# Patient Record
Sex: MALE
[De-identification: names, ages, dates, MRNs, and addresses within clinical notes are randomized per-mention and may not be internally consistent; named-entity substitution may affect disease eponyms.]

---

## 2017-07-23 ENCOUNTER — HOSPITAL ENCOUNTER (OUTPATIENT)
Dept: HOSPITAL 14 - H.ER | Age: 37
Setting detail: OBSERVATION
LOS: 1 days | Discharge: HOME | End: 2017-07-24
Attending: EMERGENCY MEDICINE | Admitting: EMERGENCY MEDICINE
Payer: MEDICAID

## 2017-07-23 VITALS — RESPIRATION RATE: 17 BRPM | OXYGEN SATURATION: 100 %

## 2017-07-23 DIAGNOSIS — Z21: ICD-10-CM

## 2017-07-23 DIAGNOSIS — K52.9: Primary | ICD-10-CM

## 2017-07-24 VITALS — DIASTOLIC BLOOD PRESSURE: 71 MMHG | HEART RATE: 77 BPM | SYSTOLIC BLOOD PRESSURE: 121 MMHG | TEMPERATURE: 98.2 F

## 2017-07-24 LAB
ALBUMIN SERPL-MCNC: 4.2 G/DL (ref 3.5–5)
ALBUMIN/GLOB SERPL: 1.1 {RATIO} (ref 1–2.1)
ALT SERPL-CCNC: 52 U/L (ref 21–72)
AST SERPL-CCNC: 43 U/L (ref 17–59)
BACTERIA #/AREA URNS HPF: (no result) /[HPF]
BASOPHILS # BLD AUTO: 0 K/UL (ref 0–0.2)
BASOPHILS NFR BLD: 0.2 % (ref 0–2)
BILIRUB UR-MCNC: NEGATIVE MG/DL
BUN SERPL-MCNC: 15 MG/DL (ref 9–20)
CALCIUM SERPL-MCNC: 8.6 MG/DL (ref 8.4–10.2)
COLOR UR: YELLOW
EOSINOPHIL # BLD AUTO: 0 K/UL (ref 0–0.7)
EOSINOPHIL NFR BLD: 0.2 % (ref 0–4)
ERYTHROCYTE [DISTWIDTH] IN BLOOD BY AUTOMATED COUNT: 14.1 % (ref 11.5–14.5)
GFR NON-AFRICAN AMERICAN: > 60
GLUCOSE UR STRIP-MCNC: (no result) MG/DL
HGB BLD-MCNC: 14.8 G/DL (ref 12–18)
LEUKOCYTE ESTERASE UR-ACNC: (no result) LEU/UL
LIPASE SERPL-CCNC: 36 U/L (ref 23–300)
LYMPHOCYTE: 4 % (ref 20–50)
LYMPHOCYTES # BLD AUTO: 0.5 K/UL (ref 1–4.3)
LYMPHOCYTES NFR BLD AUTO: 4 % (ref 20–40)
MCH RBC QN AUTO: 32.4 PG (ref 27–31)
MCHC RBC AUTO-ENTMCNC: 34 G/DL (ref 33–37)
MCV RBC AUTO: 95.3 FL (ref 80–94)
MONOCYTE: 6 % (ref 0–10)
MONOCYTES # BLD: 1.3 K/UL (ref 0–0.8)
MONOCYTES NFR BLD: 9.6 % (ref 0–10)
NEUTROPHILS # BLD: 11.6 K/UL (ref 1.8–7)
NEUTROPHILS NFR BLD AUTO: 86 % (ref 50–75)
NEUTROPHILS NFR BLD AUTO: 89 % (ref 42–75)
NRBC BLD AUTO-RTO: 0 % (ref 0–0)
PH UR STRIP: 6 [PH] (ref 5–8)
PLATELET # BLD EST: NORMAL 10*3/UL
PLATELET # BLD: 175 K/UL (ref 130–400)
PMV BLD AUTO: 9.1 FL (ref 7.2–11.7)
PROT UR STRIP-MCNC: NEGATIVE MG/DL
RBC # BLD AUTO: 4.56 MIL/UL (ref 4.4–5.9)
RBC # UR STRIP: NEGATIVE /UL
SP GR UR STRIP: 1.02 (ref 1–1.03)
TOTAL CELLS COUNTED BLD: 100
URINE CLARITY: (no result)
URINE NITRATE: NEGATIVE
UROBILINOGEN UR-MCNC: (no result) MG/DL (ref 0.2–1)
VARIANT LYMPHS NFR BLD MANUAL: 1 % (ref 0–0)
WBC # BLD AUTO: 13.4 K/UL (ref 4.8–10.8)

## 2017-07-24 NOTE — CT
EXAM:

  CT Abdomen and Pelvis With Intravenous Contrast



CLINICAL HISTORY:

  37 years old, male; Pain; Abdominal pain; Generalized; Prior surgery; Surgery 

date: 6+ months; Surgery type: Appendectomy; Additional info: HX of hiv, fever, 

n/v/abd pain



TECHNIQUE:

  Axial computed tomography images of the abdomen and pelvis with intravenous 

contrast.  This CT exam was performed using one or more of the following dose 

reduction techniques:  automated exposure control, adjustment of the mA and/or 

kV according to patient size, and/or use of iterative reconstruction technique.

  Coronal and sagittal reformatted images were created and reviewed.



CONTRAST:

  95 mL of [id number] administered intravenously.



COMPARISON:

  No relevant prior studies available.



FINDINGS:

  Limitations:  Motion artifact - mild.

  Lower thorax: No acute findings.



 ABDOMEN:

  Liver:  Unremarkable.  No mass.

  Gallbladder and bile ducts:  No calcified stones.  No ductal dilation.

  Pancreas:  No ductal dilation.  No mass.

  Spleen:  No splenomegaly.

  Adrenals:  No mass.

  Kidneys and ureters:  No mass. No hydronephrosis.

  Stomach and bowel:  Mild-to-moderate mural thickening of mid to distal small 

bowel.  No obstruction.

  Appendix:  Appendectomy.



 PELVIS:

  Bladder:  Unremarkable.

  Reproductive:  Unremarkable as visualized.



 ABDOMEN and PELVIS:

  Intraperitoneal space:  No significant fluid collection.  No free air.

  Bones/joints:  No acute fracture.

  Soft tissues:  Unremarkable.

  Vasculature:  Mild varices within LEFT upper quadrant.  No aneurysm.

  Lymph nodes:  No pathologically enlarged lymph nodes.



IMPRESSION:     

1.  Enteritis, nonspecific.  Consider inflammatory or infectious etiologies.

2.  Incidental/non-acute findings are described above.

## 2017-08-13 ENCOUNTER — HOSPITAL ENCOUNTER (EMERGENCY)
Dept: HOSPITAL 14 - H.ER | Age: 37
Discharge: HOME | End: 2017-08-13
Payer: MEDICAID

## 2017-08-13 VITALS
DIASTOLIC BLOOD PRESSURE: 68 MMHG | HEART RATE: 75 BPM | SYSTOLIC BLOOD PRESSURE: 147 MMHG | RESPIRATION RATE: 19 BRPM | TEMPERATURE: 97 F | OXYGEN SATURATION: 100 %

## 2017-08-13 DIAGNOSIS — Z79.899: ICD-10-CM

## 2017-08-13 DIAGNOSIS — Z21: ICD-10-CM

## 2017-08-13 DIAGNOSIS — M54.5: Primary | ICD-10-CM

## 2017-08-13 LAB
ALBUMIN SERPL-MCNC: 4.1 G/DL (ref 3.5–5)
ALBUMIN/GLOB SERPL: 1.2 {RATIO} (ref 1–2.1)
ALT SERPL-CCNC: 36 U/L (ref 21–72)
AST SERPL-CCNC: 29 U/L (ref 17–59)
BASOPHILS # BLD AUTO: 0.1 K/UL (ref 0–0.2)
BASOPHILS NFR BLD: 1 % (ref 0–2)
BILIRUB UR-MCNC: NEGATIVE MG/DL
BUN SERPL-MCNC: 12 MG/DL (ref 9–20)
CALCIUM SERPL-MCNC: 9.1 MG/DL (ref 8.4–10.2)
COLOR UR: (no result)
EOSINOPHIL # BLD AUTO: 0.4 K/UL (ref 0–0.7)
EOSINOPHIL NFR BLD: 7 % (ref 0–4)
ERYTHROCYTE [DISTWIDTH] IN BLOOD BY AUTOMATED COUNT: 14 % (ref 11.5–14.5)
GFR NON-AFRICAN AMERICAN: > 60
GLUCOSE UR STRIP-MCNC: (no result) MG/DL
HGB BLD-MCNC: 14.8 G/DL (ref 12–18)
LEUKOCYTE ESTERASE UR-ACNC: (no result) LEU/UL
LYMPHOCYTES # BLD AUTO: 1.5 K/UL (ref 1–4.3)
LYMPHOCYTES NFR BLD AUTO: 30.9 % (ref 20–40)
MCH RBC QN AUTO: 32.6 PG (ref 27–31)
MCHC RBC AUTO-ENTMCNC: 33.6 G/DL (ref 33–37)
MCV RBC AUTO: 97.1 FL (ref 80–94)
MONOCYTES # BLD: 0.6 K/UL (ref 0–0.8)
MONOCYTES NFR BLD: 11.7 % (ref 0–10)
NEUTROPHILS # BLD: 2.5 K/UL (ref 1.8–7)
NEUTROPHILS NFR BLD AUTO: 49.4 % (ref 50–75)
NRBC BLD AUTO-RTO: 0.1 % (ref 0–0)
PH UR STRIP: 7 [PH] (ref 5–8)
PLATELET # BLD: 175 K/UL (ref 130–400)
PMV BLD AUTO: 9.3 FL (ref 7.2–11.7)
PROT UR STRIP-MCNC: NEGATIVE MG/DL
RBC # BLD AUTO: 4.53 MIL/UL (ref 4.4–5.9)
RBC # UR STRIP: NEGATIVE /UL
SP GR UR STRIP: 1.01 (ref 1–1.03)
URINE CLARITY: CLEAR
URINE NITRATE: NEGATIVE
UROBILINOGEN UR-MCNC: (no result) MG/DL (ref 0.2–1)
WBC # BLD AUTO: 5 K/UL (ref 4.8–10.8)

## 2017-08-13 NOTE — ED PDOC
HPI: Back


Time Seen by Provider: 08/13/17 10:28


Chief Complaint (Nursing): Back Pain


Chief Complaint (Provider): Flank pain


History Per: Patient


History/Exam Limitations: no limitations


Onset/Duration Of Symptoms: Days (x 6)


Current Symptoms Are (Timing): Still Present


Additional Complaint(s): 





Jay Jay is a 36 y/o male who presents to the ED complaining of right flank pain

, ongoing for 6 days. Denies any associated fever, nausea, vomiting, dysuria, 

or hematuria. Admits taking Tylenol without relief. Patient also has medical 

history of HIV, is on anti-virals, and does not know his CBC count. 





PMD: Unknown 





Past Medical History


Reviewed: Historical Data, Nursing Documentation, Vital Signs


Vital Signs: 


 Last Vital Signs











Temp  97 F L  08/13/17 10:37


 


Pulse  75   08/13/17 10:37


 


Resp  19   08/13/17 10:37


 


BP  147/68   08/13/17 10:37


 


Pulse Ox  100   08/13/17 10:37














- Medical History


PMH: HIV





- Surgical History


Surgical History: Appendectomy





- Family History


Family History: States: Unknown Family Hx





- Social History


Ex-Smoker (has not smoked in the last 12 months): Yes


Alcohol: Occasional


Drugs: Denies





- Home Medications


Home Medications: 


 Ambulatory Orders











 Medication  Instructions  Recorded


 


Ciprofloxacin [Cipro] 500 mg PO BID 7 Days 07/24/17


 


Dicyclomine [Dicyclomine HCl] 10 mg PO TID #20 cap 07/24/17


 


metroNIDAZOLE [Flagyl] 500 mg PO BID 7 Days 07/24/17


 


Naproxen [Naprosyn] 500 mg PO BID PRN #15 tablet 08/13/17














- Allergies


Allergies/Adverse Reactions: 


 Allergies











Allergy/AdvReac Type Severity Reaction Status Date / Time


 


No Known Allergies Allergy   Verified 07/23/17 23:13














Review of Systems


ROS Statement: Except As Marked, All Systems Reviewed And Found Negative


Constitutional: Negative for: Fever


Gastrointestinal: Negative for: Nausea, Vomiting


Genitourinary Male: Negative for: Dysuria, Hematuria


Musculoskeletal: Positive for: Back Pain (Right flank pain)





Physical Exam





- Reviewed


Nursing Documentation Reviewed: Yes


Vital Signs Reviewed: Yes





- Physical Exam


Appears: Positive for: Non-toxic, In Acute Distress (mild painful distress)


Head Exam: Positive for: ATRAUMATIC, NORMAL INSPECTION, NORMOCEPHALIC


Skin: Positive for: Normal Color, Warm, Dry


Eye Exam: Positive for: EOMI, Normal appearance, PERRL


Neck: Positive for: Normal, Painless ROM, Supple


Cardiovascular/Chest: Positive for: Regular Rate, Rhythm.  Negative for: Murmur


Respiratory: Positive for: Normal Breath Sounds.  Negative for: Accessory 

Muscle Use, Respiratory Distress


Gastrointestinal/Abdominal: Positive for: Soft, Tenderness (mild right upper 

quadrant tenderness on palpation).  Negative for: Guarding, Rebound


Back: Positive for: R CVA Tenderness (mild)


Extremity: Positive for: Normal ROM.  Negative for: Pedal Edema, Deformity


Neurologic/Psych: Positive for: Alert, Oriented





- Laboratory Results


Result Diagrams: 


 08/13/17 10:50





 08/13/17 10:50





- ECG


O2 Sat by Pulse Oximetry: 100 (RA)


Pulse Ox Interpretation: Normal





Medical Decision Making


Medical Decision Making: 





Time: 10:42


Initial Impression: UTI, Pyelonephritis, nephrolithiasis


Initial Plan:


--CMP


--CBC


--ED Urine dipstick


--Urinalysis


--NS IV 1000 ml at 1000 mls/hr


--Toradol 15 mg IV


--Pending CT Abdomen/Pelvis





Time: 12:07


CT Abdomen/Pelvis


FINDINGS:





LOWER THORAX:


Unremarkable. 





LIVER:


Unremarkable. No gross lesion or ductal dilatation.  





GALLBLADDER AND BILE DUCTS:


Unremarkable. 





PANCREAS:


Unremarkable. No gross lesion or ductal dilatation.





SPLEEN:


Unremarkable. 





ADRENALS:


Unremarkable. No mass. 





KIDNEYS AND URETERS:


Unremarkable. No hydronephrosis. No solid mass. 





VASCULATURE:


Unremarkable. No aortic aneurysm. 





BOWEL:


Unremarkable. No obstruction. No gross mural thickening. 





APPENDIX:


Removed.





PERITONEUM:


Unremarkable. No free fluid. No free air. 





LYMPH NODES:


Unremarkable. No enlarged lymph nodes. 





BLADDER:


Unremarkable. 





REPRODUCTIVE:


Unremarkable. 





BONES:


No acute fracture. 





OTHER FINDINGS:


None.





IMPRESSION:


Unremarkable non contrast enhanced CT of the abdomen and pelvis.





Time: 12:12


Clinical Impression: Low back pain





Upon provider evaluation patient is medically stable, and requires no further 

treatment in the ED at this time. Patient will be discharged with Rx for 

Naprosyn PRN for pain. Counseling was provided and all questions were answered 

regarding diagnosis and need for follow up with PMD. There is agreement to 

discharge plan. Return if symptoms persist or worsen.





--------------------------------------------------------------------------------

-----------------


Scribe Attestation:


Documented by Cheyenne Bhakta, acting as a scribe for Elinor Kwon MD





Provider Scribe Attestation:


All medical record entries made by the Scribe were at my direction and 

personally dictated by me. I have reviewed the chart and agree that the record 

accurately reflects my personal performance of the history, physical exam, 

medical decision making, and the department course for this patient. I have 

also personally directed, reviewed, and agree with the discharge instructions 

and disposition.





Disposition





- Clinical Impression


Clinical Impression: 


 Low back pain








- Patient ED Disposition


Is Patient to be Admitted: No


Doctor Will See Patient In The: Office


Counseled Patient/Family Regarding: Studies Performed, Diagnosis, Need For 

Followup, Rx Given





- Disposition


Referrals: 


Ernesto Noyola MD [Primary Care Provider] - 


Disposition: Routine/Home


Disposition Time: 12:12


Condition: STABLE


Prescriptions: 


Naproxen [Naprosyn] 500 mg PO BID PRN #15 tablet


 PRN Reason: Pain, Moderate (4-7)


Instructions:  Back Pain (ED)


Forms:  [facility] (English)

## 2017-08-13 NOTE — CT
PROCEDURE:  CT Abdomen and Pelvis without intravenous contrast



HISTORY:

R flank pain



COMPARISON:

None.



TECHNIQUE:

Technique. 



Contrast Dose: 



Radiation dose:



Total exam DLP =  mGy-cm.



This CT exam was performed using one or more of the following dose 

reduction techniques: Automated exposure control, adjustment of the 

mA and/or kV according to patient size, and/or use of iterative 

reconstruction technique.



FINDINGS:



LOWER THORAX:

Unremarkable. 



LIVER:

Unremarkable. No gross lesion or ductal dilatation.  



GALLBLADDER AND BILE DUCTS:

Unremarkable. 



PANCREAS:

Unremarkable. No gross lesion or ductal dilatation.



SPLEEN:

Unremarkable. 



ADRENALS:

Unremarkable. No mass. 



KIDNEYS AND URETERS:

Unremarkable. No hydronephrosis. No solid mass. 



VASCULATURE:

Unremarkable. No aortic aneurysm. 



BOWEL:

Unremarkable. No obstruction. No gross mural thickening. 



APPENDIX:

Removed.



PERITONEUM:

Unremarkable. No free fluid. No free air. 



LYMPH NODES:

Unremarkable. No enlarged lymph nodes. 



BLADDER:

Unremarkable. 



REPRODUCTIVE:

Unremarkable. 



BONES:

No acute fracture. 



OTHER FINDINGS:

None.



IMPRESSION:

Unremarkable non contrast enhanced CT of the abdomen and pelvis.

## 2018-01-19 ENCOUNTER — HOSPITAL ENCOUNTER (EMERGENCY)
Dept: HOSPITAL 14 - H.ER | Age: 38
Discharge: HOME | End: 2018-01-19
Payer: MEDICAID

## 2018-01-19 VITALS
OXYGEN SATURATION: 99 % | SYSTOLIC BLOOD PRESSURE: 137 MMHG | TEMPERATURE: 97.7 F | DIASTOLIC BLOOD PRESSURE: 72 MMHG | HEART RATE: 98 BPM | RESPIRATION RATE: 21 BRPM

## 2018-01-19 DIAGNOSIS — J40: ICD-10-CM

## 2018-01-19 DIAGNOSIS — B20: ICD-10-CM

## 2018-01-19 DIAGNOSIS — J11.1: Primary | ICD-10-CM

## 2018-01-19 NOTE — ED PDOC
HPI: CCC, URI, Sore Throat


Time Seen by Provider: 01/19/18 08:53


Chief Complaint (Nursing): Cough, Cold, Congestion


Chief Complaint (Provider): Cough, sore throat


History Per: Patient


History/Exam Limitations: no limitations


Onset/Duration Of Symptoms: Days (x3)


Current Symptoms Are (Timing): Still Present


Location Of Pain: Throat


Associated Symptoms: Cough (productive), Sputum (yellow), Other (body aches and 

sore throat).  denies: Fever


Ear Symptoms: Bilateral: None


Additional Complaint(s): 





Jay Jay Farias is a 37 year old male, with a past medical history of HIV 

positive, who presents to the emergency department complaining of sore throat, 

productive cough with yellow sputum associated with body aches onset for x3 

days. Patient denies any fever or shortness of breath. No further medical 

complaints.





PMD: Ernesto Noyola





Past Medical History


Reviewed: Historical Data, Nursing Documentation, Vital Signs


Vital Signs: 


 Last Vital Signs











Temp  97.7 F   01/19/18 08:48


 


Pulse  98 H  01/19/18 08:48


 


Resp  21   01/19/18 08:48


 


BP  137/72   01/19/18 08:48


 


Pulse Ox  99   01/19/18 09:07














- Medical History


PMH: HIV





- Surgical History


Surgical History: Appendectomy





- Family History


Family History: States: Unknown Family Hx





- Social History


Ex-Smoker (has not smoked in the last 12 months): Yes


Alcohol: Social


Drugs: Denies





- Home Medications


Home Medications: 


 Ambulatory Orders











 Medication  Instructions  Recorded


 


Ciprofloxacin [Cipro] 500 mg PO BID 7 Days  tab 07/24/17


 


Dicyclomine [Dicyclomine HCl] 10 mg PO TID #20 cap 07/24/17


 


metroNIDAZOLE [Flagyl] 500 mg PO BID 7 Days  tab 07/24/17


 


Naproxen [Naprosyn] 500 mg PO BID PRN #15 tablet 08/13/17


 


Azithromycin [Zithromax] 250 mg PO DAILY #6 tab 01/19/18


 


Oseltamivir [Tamiflu] 75 mg PO BID #10 cap 01/19/18














- Allergies


Allergies/Adverse Reactions: 


 Allergies











Allergy/AdvReac Type Severity Reaction Status Date / Time


 


No Known Allergies Allergy   Verified 07/23/17 23:13














Review of Systems


ROS Statement: Except As Marked, All Systems Reviewed And Found Negative


Constitutional: Positive for: Other (body aches).  Negative for: Fever


ENT: Positive for: Throat Pain


Respiratory: Positive for: Cough (productive), Sputum (yellow).  Negative for: 

Shortness of Breath





Physical Exam





- Physical Exam


Appears: Positive for: Non-toxic


Head Exam: Positive for: ATRAUMATIC, NORMAL INSPECTION, NORMOCEPHALIC


Skin: Positive for: Normal Color, Warm, Dry


Eye Exam: Positive for: Normal appearance


ENT: Positive for: Pharyngeal Erythema.  Negative for: Tonsillar Exudate


Neck: Positive for: Painless ROM, Supple


Cardiovascular/Chest: Positive for: Regular Rate, Rhythm.  Negative for: Murmur


Respiratory: Positive for: Rhonchi (scattered).  Negative for: Wheezing


Gastrointestinal/Abdominal: Positive for: Normal Exam, Soft.  Negative for: 

Tenderness


Back: Positive for: Normal Inspection.  Negative for: L CVA Tenderness, R CVA 

Tenderness


Extremity: Positive for: Normal ROM.  Negative for: Deformity, Swelling


Neurologic/Psych: Positive for: Alert, Oriented





- ECG


O2 Sat by Pulse Oximetry: 99 (RA)


Pulse Ox Interpretation: Normal





Medical Decision Making


Medical Decision Making: 





Initial Plan:





--Chest two views (PA/LAT) [RAD]


--Influenza A B


--reevaluation








--------------------------------------------------------------------------------

-----------------   


Scribe Attestation:


Documented by Agustin Estrada, acting as a scribe for Javier Gunderson MD





Provider Scribe Attestation:


All medical record entries made by the Scribe were at my direction and 

personally dictated by me. I have reviewed the chart and agree that the record 

accurately reflects my personal performance of the history, physical exam, 

medical decision making, and the department course for this patient. I have 

also personally directed, reviewed, and agree with the discharge instructions 

and disposition.





Disposition





- Clinical Impression


Clinical Impression: 


 Influenza, Bronchitis








- Patient ED Disposition


Is Patient to be Admitted: No


Counseled Patient/Family Regarding: Studies Performed, Diagnosis, Need For 

Followup, Rx Given





- Disposition


Referrals: 


Ernesto Noyola MD [Staff Provider] - 


Disposition: Routine/Home


Disposition Time: 09:43


Condition: FAIR


Prescriptions: 


Azithromycin [Zithromax] 250 mg PO DAILY #6 tab


Oseltamivir [Tamiflu] 75 mg PO BID #10 cap


Instructions:  Influenza (ED), Acute Bronchitis (ED)


Forms:  [facility] (English)

## 2024-03-06 NOTE — ED PDOC
HPI:Nausea, Vomiting, Diarrhea


Time Seen by Provider: 17 23:34


Chief Complaint (Nursing): GI Problem


Chief Complaint (Provider): Abdominal Pain, Vomiting, Nausea and Diarrhea


History Per: Patient


History/Exam Limitations: no limitations


Onset/Duration Of Symptoms: Hrs


Current Symptoms Are (Timing): Still Present


Associated Symptoms: Nausea, Vomiting, Diarrhea


Additional Complaint(s): 


Jay Jay Farias, a 37 year old male, who has HIV (does not know CV4 or 

viral load) and is status post appendectomy presents to the ED complaining of 

diarrhea, nausea, abdominal pain and vomiting. The patient [facility] that [facility] and around [facility] he started developing fever and pain. He states 

that he has had 10 episodes of non bloody and non bilious vomiting and 

diarrhea. Patient is compliant with all HIV medication.








Past Medical History


Reviewed: Historical Data, Nursing Documentation, Vital Signs


Vital Signs: 


 Last Vital Signs











Temp  97.5 F L  17 23:10


 


Pulse  85   17 23:10


 


Resp  17   17 23:10


 


BP  127/74   17 23:10


 


Pulse Ox  100   17 23:10














- Medical History


PMH: HIV





- Surgical History


Surgical History: Appendectomy





- Family History


Family History: States: Unknown Family Hx





- Home Medications


Home Medications: 


 Ambulatory Orders











 Medication  Instructions  Recorded


 


Ciprofloxacin [Cipro] 500 mg PO BID 7 Days 17


 


Dicyclomine [Dicyclomine HCl] 10 mg PO TID #20 cap 17


 


metroNIDAZOLE [Flagyl] 500 mg PO BID 7 Days 17














- Allergies


Allergies/Adverse Reactions: 


 Allergies











Allergy/AdvReac Type Severity Reaction Status Date / Time


 


No Known Allergies Allergy   Verified 17 23:13














Review of Systems


Constitutional: Positive for: Fever


Gastrointestinal: Positive for: Nausea, Vomiting (x10 episodes (non bloody, non 

bilious)), Abdominal Pain, Diarrhea





Physical Exam





- Reviewed


Nursing Documentation Reviewed: Yes


Vital Signs Reviewed: Yes





- Physical Exam


Appears: Positive for: Non-toxic, No Acute Distress


Head Exam: Positive for: ATRAUMATIC, NORMAL INSPECTION, NORMOCEPHALIC


Skin: Positive for: Warm, Dry, Pallor


Eye Exam: Positive for: Normal appearance, EOMI, PERRL


ENT: Positive for: Normal ENT Inspection


Neck: Positive for: Normal, Painless ROM, Supple


Cardiovascular/Chest: Positive for: Regular Rate, Rhythm, Chest Non Tender.  

Negative for: Tachycardia


Respiratory: Positive for: Normal Breath Sounds.  Negative for: Wheezing, 

Respiratory Distress


Gastrointestinal/Abdominal: Positive for: Soft, Tenderness (Right upper and 

lower tenderness to palpation.).  Negative for: Guarding, Rebound


Neurologic/Psych: Positive for: Alert, Oriented, Gait





- Laboratory Results


Result Diagrams: 


 17 00:14





 17 00:14





- ECG


O2 Sat by Pulse Oximetry: 100 (RA)


Pulse Ox Interpretation: Normal





Medical Decision Making


Medical Decision Makin Initial Impression: Diverticulitis vs Colitis vs Other intraabdominal 

infection





Initial Plan:


* ABD&PELV PO and IV Contrast


* Comp Metabolic Panel


* Lactic Acid


* Lipase


* CBC


* Morphine 2mg IVP


* NS 1000mls IV 1000mls/hr


* Omnipaque 50ml PO


* Zofran 4mg IVP


* Blood Culture


* Urine Culture


* ED Rectal Temp


* Urinalysis


* Reevaluation





0358


CT Abdomen and Pelvis With Intravenous Contrast


IMPRESSION:


1. Enteritis, nonspecific. Consider inflammatory or infectious etiologies.


2. Incidental/non-acute findings are described above.





0416


Patient tolerated PO challenge


Patient instructed to follow up with PMD and instructed to return to ED if 

symptoms persist.





_______________________________________________________________


Scribe Attestation


Documented by Breana López acting as a scribe for Junior Eid MD.





Provider Attestation


All medical record entries made by the Scribe were at my direction and 

personally dictated by me. I have reviewed the chart and agree that the record 

accurately reflects my personal performance of the history, physical exam, 

medical decision making, and the department course for this patient. I have 

also personally directed, reviewed, and agree with the discharge instructions 

and disposition.





ED OBSERVATION


Date of observation admission: 17


Time of observation admission: 00:45





- Observation admission statement


Patient is being placed in observation because:: 


 Pending ABD&PELV CT results








- Progress Note


Progress Note: 


17 00:47


Patient is stable.





17 02:09


Patient is resting comfortably.





17 04:16


Patient is feeling better, PO challenge is being tolerated.





Disposition





- Clinical Impression


Clinical Impression: 


 Enteritis








- Disposition


Disposition: Routine/Home


Disposition Time: 04:07


Condition: IMPROVED
Him/He